# Patient Record
Sex: FEMALE | Race: WHITE | NOT HISPANIC OR LATINO | URBAN - METROPOLITAN AREA
[De-identification: names, ages, dates, MRNs, and addresses within clinical notes are randomized per-mention and may not be internally consistent; named-entity substitution may affect disease eponyms.]

---

## 2017-07-13 ENCOUNTER — IMPORTED ENCOUNTER (OUTPATIENT)
Dept: URBAN - METROPOLITAN AREA CLINIC 50 | Facility: CLINIC | Age: 77
End: 2017-07-13

## 2017-08-14 ENCOUNTER — IMPORTED ENCOUNTER (OUTPATIENT)
Dept: URBAN - METROPOLITAN AREA CLINIC 50 | Facility: CLINIC | Age: 77
End: 2017-08-14

## 2018-06-14 ENCOUNTER — IMPORTED ENCOUNTER (OUTPATIENT)
Dept: URBAN - METROPOLITAN AREA CLINIC 50 | Facility: CLINIC | Age: 78
End: 2018-06-14

## 2018-10-01 ENCOUNTER — IMPORTED ENCOUNTER (OUTPATIENT)
Dept: URBAN - METROPOLITAN AREA CLINIC 50 | Facility: CLINIC | Age: 78
End: 2018-10-01

## 2019-05-20 ENCOUNTER — IMPORTED ENCOUNTER (OUTPATIENT)
Dept: URBAN - METROPOLITAN AREA CLINIC 50 | Facility: CLINIC | Age: 79
End: 2019-05-20

## 2019-05-20 NOTE — PATIENT DISCUSSION
"""Reassured patient that intraocular pressures (IOPs) are at target levels and other ocular findings are stable. Continue present management and/or medication(s). Follow up as directed. "" ""Continue Alphagan P 0.15% both eyes twice a day . """

## 2019-08-28 ENCOUNTER — IMPORTED ENCOUNTER (OUTPATIENT)
Dept: URBAN - METROPOLITAN AREA CLINIC 50 | Facility: CLINIC | Age: 79
End: 2019-08-28

## 2021-05-14 ASSESSMENT — TONOMETRY
OD_IOP_MMHG: 15
OD_IOP_MMHG: 13
OS_IOP_MMHG: 14
OD_IOP_MMHG: 14
OD_IOP_MMHG: 12
OD_IOP_MMHG: 13
OS_IOP_MMHG: 13
OS_IOP_MMHG: 15
OS_IOP_MMHG: 14
OD_IOP_MMHG: 14
OS_IOP_MMHG: 14
OS_IOP_MMHG: 13

## 2021-05-14 ASSESSMENT — PACHYMETRY
OD_CT_UM: 569
OD_CT_UM: 569
OS_CT_UM: 570
OD_CT_UM: 569
OS_CT_UM: 570
OD_CT_UM: 569
OD_CT_UM: 569
OS_CT_UM: 570
OD_CT_UM: 569

## 2021-05-14 ASSESSMENT — VISUAL ACUITY
OD_SC: 20/25
OD_SC: 20/30+
OD_CC: J1+
OS_SC: 20/50-2
OD_SC: 20/40+2
OS_SC: 20/30-
OS_SC: 20/25
OS_CC: J1+